# Patient Record
Sex: MALE | Race: WHITE | NOT HISPANIC OR LATINO | Employment: STUDENT | URBAN - METROPOLITAN AREA
[De-identification: names, ages, dates, MRNs, and addresses within clinical notes are randomized per-mention and may not be internally consistent; named-entity substitution may affect disease eponyms.]

---
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
## 2022-12-01 ENCOUNTER — CONSULT (OUTPATIENT)
Dept: DERMATOLOGY | Facility: CLINIC | Age: 22
End: 2022-12-01

## 2022-12-01 VITALS — HEIGHT: 74 IN

## 2022-12-01 DIAGNOSIS — L72.3 INFLAMED EPIDERMOID CYST OF SKIN: Primary | ICD-10-CM

## 2022-12-01 RX ORDER — CEPHALEXIN 500 MG/1
500 CAPSULE ORAL 3 TIMES DAILY
COMMUNITY
Start: 2022-11-22 | End: 2022-12-02

## 2022-12-01 NOTE — PATIENT INSTRUCTIONS
INFLAMED CYST    Assessment and Plan:  Based on a thorough discussion of this condition and the management approach to it (including a comprehensive discussion of the known risks, side effects and potential benefits of treatment), the patient (family) agrees to implement the following specific plan:  Finish cephalexin course of antibiotics  Referral to plastic surgery will be placed for removal

## 2022-12-01 NOTE — PROGRESS NOTES
[facility] Note     Patient Name: Meagan Muse  Encounter Date: 12/01/2022     Have you been cared for by a [place] [address] Dermatologist in the last 3 years and, if so, which description applies to you? NO  I am considered a "new" patient and must complete all patient intake questions  I am MALE/not capable of bearing children  REVIEW OF SYSTEMS:  Have you recently had or currently have any of the following? · Recent fever or chills? No  · Any non-healing wound? No   PAST MEDICAL HISTORY:  Have you personally ever had or currently have any of the following? If "YES," then please provide more detail  · Skin cancer (such as Melanoma, Basal Cell Carcinoma, Squamous Cell Carcinoma? No  · Tuberculosis, HIV/AIDS, Hepatitis B or C: No  · Systemic Immunosuppression such as Diabetes, Biologic or Immunotherapy, Chemotherapy, Organ Transplantation, Bone Marrow Transplantation No  · Radiation Treatment No   FAMILY HISTORY:  Any "first degree relatives" (parent, brother, sister, or child) with the following? • Skin Cancer, Pancreatic or Other Cancer? No   PATIENT EXPERIENCE:    • Do you want the Dermatologist to perform a COMPLETE skin exam today including a clinical examination under the "bra and underwear" areas? NO  • If necessary, do we have your permission to call and leave a detailed message on your Preferred Phone number that includes your specific medical information?   Yes      Allergies   Allergen Reactions   • Coricidin Hbp Congestion-Cough [Dextromethorphan-Guaifenesin] Hives      Current Outpatient Medications:   •  cephalexin (KEFLEX) 500 mg capsule, Take 500 mg by mouth Three times a day, Disp: , Rfl:           • Whom besides the patient is providing clinical information about today's encounter?   o NO ADDITIONAL HISTORIAN (patient alone provided history)    Physical Exam and Assessment/Plan by Diagnosis:    INFLAMED CYST  Physical Exam:  • Anatomic Location Affected:  Right cheek  • Morphological Description:  1 5 cm skin colored nodule with overlying erythema     Additional History of Present Condition:  Patient's mom states it has been there for months  It drains  Went to the ED for it and they gave him antibiotics  Currently on cephalexin  Patient states it has drained in past but sack does not go away and it fills back up  Patient denies any pain or symptoms today  Assessment and Plan:  Based on a thorough discussion of this condition and the management approach to it (including a comprehensive discussion of the known risks, side effects and potential benefits of treatment), the patient (family) agrees to implement the following specific plan:  • Finish cephalexin course of antibiotics  • Referral to plastic surgery will be placed for removal    The patient was seen and discussed with Dr Eron Blanco       RTC: As needed    Jaret Chung  Dermatology PGY-4 Resident Physician

## 2022-12-14 ENCOUNTER — TELEPHONE (OUTPATIENT)
Dept: DERMATOLOGY | Age: 22
End: 2022-12-14

## 2022-12-14 NOTE — TELEPHONE ENCOUNTER
Received VM from pt's mother regarding scheduling appt for removal of a cyst  Per notes from last office visit, patient was referred to plastic surgeon  Advised of referral and gave number for plastics

## 2023-01-31 ENCOUNTER — CONSULT (OUTPATIENT)
Dept: PLASTIC SURGERY | Facility: CLINIC | Age: 23
End: 2023-01-31

## 2023-01-31 DIAGNOSIS — L72.3 INFLAMED EPIDERMOID CYST OF SKIN: Primary | ICD-10-CM

## 2023-01-31 RX ORDER — CEPHALEXIN 500 MG/1
500 CAPSULE ORAL EVERY 12 HOURS SCHEDULED
Qty: 10 CAPSULE | Refills: 0 | Status: SHIPPED | OUTPATIENT
Start: 2023-01-31 | End: 2023-02-05

## 2023-01-31 NOTE — PROGRESS NOTES
Plastic Surgery H&P  [address] [place] [address], [address]      Assessment/Plan:    Assessment:  1  Cystic mass of right cheek  2  Cystic acne     Plan:  Discussed surgical excision of right cheek cystic mass  All details of the procedure were discussed at length with the patient including as well as, all potential risks, benefits, and alternatives  Risks discussed included, but were not limited to: infection, bleeding, scarring, hematoma, delayed healing, recurrence, open wound, need for additional procedures  The patient noted his understanding and had opportunity for questions  All were answered  He desires to proceed  See procedure note for details  He will f/u in 1 week for suture removal  Post procedure instructions given  Charmaine Ferrera is a 25 y o  male who is referred by Dr Hanny Peoples in consultation for evaluation of recurrent, inflamed right cheek cyst  Patient's mom states it has been there for months  It drains  Went to the ED for it when it was inflamed and they gave him antibiotics-Keflex  The swelling resolved  Patient states it has drained in past but sack does not go away and it fills back up  Patient denies any pain or symptoms today  They are requesting removal today  Patient reports no other similar lesions  He denies any personal or family history of skin cancer  He has underlying history of cystic acne  History reviewed  No pertinent past medical history  History reviewed  No pertinent surgical history  Current Outpatient Medications:   •  cephalexin (KEFLEX) 500 mg capsule, Take 1 capsule (500 mg total) by mouth every 12 (twelve) hours for 5 days, Disp: 10 capsule, Rfl: 0    Allergies   Allergen Reactions   • Coricidin Hbp Congestion-Cough [Dextromethorphan-Guaifenesin] Hives   • Oxycodone-Acetaminophen Hives       History reviewed  No pertinent family history      Social History     Socioeconomic History • Marital status: Single     Spouse name: Not on file   • Number of children: Not on file   • Years of education: Not on file   • Highest education level: Not on file   Occupational History   • Not on file   Tobacco Use   • Smoking status: Unknown   • Smokeless tobacco: Not on file   Substance and Sexual Activity   • Alcohol use: Yes   • Drug use: Yes     Types: Cocaine     Comment: acid   • Sexual activity: Not on file   Other Topics Concern   • Not on file   Social History Narrative   • Not on file     Social Determinants of Health     Financial Resource Strain: Not on file   Food Insecurity: Not on file   Transportation Needs: Not on file   Physical Activity: Not on file   Stress: Not on file   Social Connections: Not on file   Intimate Partner Violence: Not on file   Housing Stability: Not on file       Review of Systems  Pertinent items are noted in HPI  Objective     There were no vitals taken for this visit  General:  alert and oriented, in no acute distress   Skin:  normal and no rash or abnormalities +1cm erythematous patch over right upper cheek with 3 punctum's present   Eyes: conjunctivae/corneas clear  PERRL, EOM's intact  Fundi benign  Mouth: MMM no lesions   Lymph Nodes:  Cervical, supraclavicular, and axillary nodes normal    Lungs:  clear to auscultation bilaterally   Heart:  regular rate and rhythm, S1, S2 normal, no murmur, click, rub or gallop and regular rate and rhythm   Abdomen: soft, non-tender; bowel sounds normal; no masses,  no organomegaly   CVA:  absent   Genitourinary: defer exam   Extremities:  extremities normal, warm and well-perfused; no cyanosis, clubbing, or edema   Neurologic:  Alert and oriented x3  Gait normal  Reflexes and motor strength normal and symmetric  Cranial nerves 2-12 and sensation grossly intact     Psychiatric:  normal mood, behavior, speech, dress, and thought processes        Excision of soft tissue mass of face with complex closure, 2 5cm, CPT 42337, 37790    Date/Time: 1/31/2023 1:44 PM  Performed by: Steve Hopper MD  Authorized by: Steve Hopper MD   Universal Protocol:  Consent: Verbal consent obtained  Written consent obtained  Risks and benefits: risks, benefits and alternatives were discussed  Consent given by: patient  Time out: Immediately prior to procedure a "time out" was called to verify the correct patient, procedure, equipment, support staff and site/side marked as required  Patient understanding: patient states understanding of the procedure being performed  Patient consent: the patient's understanding of the procedure matches consent given  Patient identity confirmed: verbally with patient      Procedure Details - Skin Excision:     Number of Lesions:  1  Lesion 1:     Body area:  Head/neck    Head/neck location:  R cheek       Malignancy: benign lesion      Repair type:  Linear closure    Closure complexity: complex      Surgical defect:  2 5 x 1 5cm    Repair size (cm):  2 5     Repair Comments: Patient was placed supine on the procedure table  The right cheek was prepped and cleansed with alcohol  Local anesthesia using quarter percent Marcaine with epinephrine was then injected preferentially around the planned excision area, 3 cc  After allowing time for hemostasis and analgesia, an elliptical incision was made around the affected skin measuring 2 x 1 0 cm in size using a 15 blade scalpel  The incision was taken down through the skin  Upon entering the subcutaneous tissue, 2 adjacent cystic tracks were encountered  The tracks and underlying masses were dissected widely out through the subcutaneous tissue circumferentially around the coalesced masses using a 15 blade scalpel as well as curved iris scissors  The skin and underlying masses were then excised en bloc and sent to pathology as a specimen  The defect measured 2 5 x 1 5 cm in size  The skin edges were very adherent to the underlying subcutaneous tissue  Therefore, wide undermining was necessary for tension-free closure  Wide undermining of the adjacent tissue was performed with curved iris scissors  These skin edges were then advanced and the wound was closed in layers  The deep dermal layer was closed with buried interrupted 5-0 Monocryl sutures  The skin was closed with running 6-0 nylon suture  Excellent approximation was obtained  Total length of complex wound closure was 2 5 cm  The incision site was cleansed and dried, Steri-Strips were applied  The patient tolerated the procedure well  There are no complications  DC home  Post procedure instructions given